# Patient Record
Sex: FEMALE | Race: WHITE | NOT HISPANIC OR LATINO | RURAL
[De-identification: names, ages, dates, MRNs, and addresses within clinical notes are randomized per-mention and may not be internally consistent; named-entity substitution may affect disease eponyms.]

---

## 2024-08-21 ENCOUNTER — OFFICE VISIT (OUTPATIENT)
Dept: FAMILY MEDICINE | Facility: CLINIC | Age: 59
End: 2024-08-21
Payer: COMMERCIAL

## 2024-08-21 VITALS
DIASTOLIC BLOOD PRESSURE: 70 MMHG | RESPIRATION RATE: 16 BRPM | TEMPERATURE: 98 F | SYSTOLIC BLOOD PRESSURE: 120 MMHG | HEART RATE: 78 BPM | HEIGHT: 64 IN | BODY MASS INDEX: 36.31 KG/M2 | WEIGHT: 212.69 LBS | OXYGEN SATURATION: 98 %

## 2024-08-21 DIAGNOSIS — R53.83 FATIGUE, UNSPECIFIED TYPE: ICD-10-CM

## 2024-08-21 DIAGNOSIS — R63.5 WEIGHT GAIN: Primary | ICD-10-CM

## 2024-08-21 PROCEDURE — 1160F RVW MEDS BY RX/DR IN RCRD: CPT | Mod: ,,, | Performed by: NURSE PRACTITIONER

## 2024-08-21 PROCEDURE — 1159F MED LIST DOCD IN RCRD: CPT | Mod: ,,, | Performed by: NURSE PRACTITIONER

## 2024-08-21 PROCEDURE — 99202 OFFICE O/P NEW SF 15 MIN: CPT | Mod: ,,, | Performed by: NURSE PRACTITIONER

## 2024-08-21 PROCEDURE — 3008F BODY MASS INDEX DOCD: CPT | Mod: ,,, | Performed by: NURSE PRACTITIONER

## 2024-08-21 PROCEDURE — 3078F DIAST BP <80 MM HG: CPT | Mod: ,,, | Performed by: NURSE PRACTITIONER

## 2024-08-21 PROCEDURE — 3074F SYST BP LT 130 MM HG: CPT | Mod: ,,, | Performed by: NURSE PRACTITIONER

## 2024-08-21 RX ORDER — FLUOXETINE HYDROCHLORIDE 20 MG/1
60 CAPSULE ORAL DAILY
COMMUNITY
Start: 2024-07-08

## 2024-08-21 NOTE — PROGRESS NOTES
Glo Garcia NP   [address] [place] [address]     PATIENT NAME: Amee Mckeon  : 1965  DATE: 24  MRN: [id number]      Billing Provider: Glo Garcia NP  Level of Service:   Patient PCP Information       Provider PCP Type    Primary Doctor No General            Reason for Visit / Chief Complaint: Establish Care (Establish care,discuss weight gain.)       Update PCP  Update Chief Complaint         History of Present Illness / Problem Focused Workflow     Amee Mckeon presents to the clinic with Establish Care (Establish care,discuss weight gain.)     HPI  Patient presents to clinic today wishing to establish care. She is a previous patient of SOHEILA Marie NP. She has a PMH of anxiety/depression. She is also a patient of Dr. Powers, cardiology, and is being worked up for some tachycardia and bradycardia episodes. She is scheduled for stress test and ultrasound tomorrow. Last lab work was approximately 1 week ago. She recently had a sleep study that was normal. Her main concern today is weight gain. She reports that she is the heaviest she has ever been. She has tried dieting and exercise without resolve. She has had a hysterectomy and is not currently on any hormone therapy. She reports having hormone labs drawn a couple years ago but was told they were normal. She does also work both night shift and day shift and reports that this is very taxing on her body.    Review of Systems     Review of Systems   Constitutional:  Positive for fatigue and unexpected weight change. Negative for activity change, appetite change, chills and fever.   HENT:  Negative for ear pain, hearing loss, trouble swallowing and voice change.    Eyes:  Negative for visual disturbance.   Respiratory:  Negative for apnea, cough, chest tightness and shortness of breath.    Cardiovascular:  Negative for chest pain, palpitations and leg swelling.   Gastrointestinal:  Negative for abdominal pain, blood in stool, change in bowel  "habit and reflux.   Genitourinary:  Negative for bladder incontinence, difficulty urinating and flank pain.   Musculoskeletal:  Negative for back pain, gait problem, joint swelling and myalgias.   Integumentary:  Negative for color change and pallor.   Neurological:  Negative for dizziness, weakness, numbness and headaches.   Psychiatric/Behavioral:  Negative for sleep disturbance. The patient is not nervous/anxious.         Medical / Social / Family History   History reviewed. No pertinent past medical history.    History reviewed. No pertinent surgical history.    Social History  Ms.  reports that she has never smoked. She has never used smokeless tobacco. She reports that she does not drink alcohol and does not use drugs.    Family History  Ms.'s family history is not on file.    Medications and Allergies     Medications  Outpatient Medications Marked as Taking for the 8/21/24 encounter (Office Visit) with Glo Garcia NP   Medication Sig Dispense Refill    FLUoxetine 20 MG capsule Take 60 mg by mouth once daily.         Allergies  Review of patient's allergies indicates:  No Known Allergies    Physical Examination   /70 (BP Location: Left arm, Patient Position: Sitting, BP Method: Large (Manual))   Pulse 78   Temp 97.8 °F (36.6 °C) (Oral)   Resp 16   Ht 5' 4" (1.626 m)   Wt 96.5 kg (212 lb 11.2 oz)   SpO2 98%   BMI 36.51 kg/m²    Physical Exam  Vitals and nursing note reviewed.   Constitutional:       Appearance: Normal appearance. She is obese.   HENT:      Head: Normocephalic.      Nose: Nose normal.      Mouth/Throat:      Mouth: Mucous membranes are moist.   Eyes:      Extraocular Movements: Extraocular movements intact.      Conjunctiva/sclera: Conjunctivae normal.      Pupils: Pupils are equal, round, and reactive to light.   Cardiovascular:      Rate and Rhythm: Normal rate and regular rhythm.      Pulses: Normal pulses.      Heart sounds: Normal heart sounds.   Pulmonary:      Effort: " Pulmonary effort is normal.      Breath sounds: Normal breath sounds.   Abdominal:      General: Abdomen is flat. Bowel sounds are normal.      Palpations: Abdomen is soft.   Musculoskeletal:         General: Normal range of motion.      Cervical back: Normal range of motion and neck supple.   Skin:     General: Skin is warm and dry.      Capillary Refill: Capillary refill takes less than 2 seconds.   Neurological:      General: No focal deficit present.      Mental Status: She is alert and oriented to person, place, and time.   Psychiatric:         Behavior: Behavior normal.         Thought Content: Thought content normal.          Assessment and Plan (including Health Maintenance)      Problem List  Smart Sets  Document Outside HM   :    Plan:   Recommend repeat hormone lab work. Let me know if need referral.  Will obtain recent labs from Dr. Powers for review.  Continue diet and exercise.  RTC as needed.      Health Maintenance Due   Topic Date Due    Hepatitis C Screening  Never done    Cervical Cancer Screening  Never done    Lipid Panel  Never done    HIV Screening  Never done    TETANUS VACCINE  Never done    Mammogram  Never done    Hemoglobin A1c (Diabetic Prevention Screening)  Never done    Colorectal Cancer Screening  Never done    Shingles Vaccine (1 of 2) Never done    COVID-19 Vaccine (1 - 2023-24 season) Never done       Problem List Items Addressed This Visit    None      Health Maintenance Topics with due status: Not Due       Topic Last Completion Date    Influenza Vaccine Not Due       No future appointments.         Signature:  Glo Garcia NP      [address]   [place] [address]    Date of encounter: 8/21/24

## 2025-01-16 ENCOUNTER — OFFICE VISIT (OUTPATIENT)
Dept: FAMILY MEDICINE | Facility: CLINIC | Age: 60
End: 2025-01-16
Payer: COMMERCIAL

## 2025-01-16 VITALS
BODY MASS INDEX: 37.05 KG/M2 | RESPIRATION RATE: 20 BRPM | HEART RATE: 70 BPM | WEIGHT: 217 LBS | HEIGHT: 64 IN | OXYGEN SATURATION: 99 % | TEMPERATURE: 99 F | DIASTOLIC BLOOD PRESSURE: 67 MMHG | SYSTOLIC BLOOD PRESSURE: 102 MMHG

## 2025-01-16 DIAGNOSIS — J10.1 INFLUENZA A: Primary | ICD-10-CM

## 2025-01-16 RX ORDER — PROMETHAZINE HYDROCHLORIDE AND DEXTROMETHORPHAN HYDROBROMIDE 6.25; 15 MG/5ML; MG/5ML
5 SYRUP ORAL NIGHTLY PRN
Qty: 120 ML | Refills: 0 | Status: SHIPPED | OUTPATIENT
Start: 2025-01-16

## 2025-01-16 RX ORDER — OSELTAMIVIR PHOSPHATE 75 MG/1
75 CAPSULE ORAL 2 TIMES DAILY
Qty: 10 CAPSULE | Refills: 0 | Status: SHIPPED | OUTPATIENT
Start: 2025-01-16 | End: 2025-01-21

## 2025-01-16 NOTE — LETTER
January 16, 2025    Amee Mckeon  [address] [place] [address] [place] [address]             [facility]  Family Medicine  [address]  [place] [address]  Phone: [phone]   January 16, 2025     Patient: Amee Mckeon   YOB: 1965   Date of Visit: 1/16/2025       To Whom it May Concern:    Amee Mckeon was seen in my clinic on 1/16/2025. She may return to work on 01/20/2025 .    Please excuse her from any classes or work missed.    If you have any questions or concerns, please don't hesitate to call.    Sincerely,         Glo Garcia, NP

## 2025-01-16 NOTE — LETTER
January 21, 2025      [facility]  [address]  [place] [address]  Phone: [phone]       Patient: Amee Mckeon   YOB: 1965  Date of Visit: 01/16/2025    To Whom It May Concern:    EDWIGE Mckeon  was at [facility] on 01/16/2025. The patient may return to work/school on 01/22/2025 with no restrictions. If you have any questions or concerns, or if I can be of further assistance, please do not hesitate to contact me.    Sincerely,    Ericka Esteban LPN

## 2025-02-16 NOTE — PROGRESS NOTES
Glo Garcia NP   [address] [place] [address]     PATIENT NAME: Amee cMkeon  : 1965  DATE: 25  MRN: [id number]      Billing Provider: Glo Garcia NP  Level of Service: [place] OFFICE/OUTPT VISIT, EST, LEVL III, 20-29 MIN  Patient PCP Information       Provider PCP Type    Primary Doctor No General            Reason for Visit / Chief Complaint: Cough, Nasal Congestion, Generalized Body Aches, and Fever (Symptoms x1 day)       Update PCP  Update Chief Complaint         History of Present Illness / Problem Focused Workflow     Amee Mckeon presents to the clinic with Cough, Nasal Congestion, Generalized Body Aches, and Fever (Symptoms x1 day)     History of Present Illness    HPI:  Patient's symptoms began suddenly the previous day while at work, continuing until 2:30 PM when they rapidly worsened. Symptoms include coughing, congestion, body aches, fever, sore throat, and ear pain. Patient rarely has fevers and finds the cough particularly troublesome. She spent most of yesterday bedridden, only getting up a few times. Her urine has a strong ammonia odor, possibly due to dehydration. She has been drinking water but may not have consumed enough fluids yesterday. Patient hopes to return to work in a few days on Monday night.    MEDICAL HISTORY:  Patient has a history of flu, with a recent positive test.    TEST RESULTS:  Patient had a flu test in the past, which came back positive. She also underwent a COVID test previously, which was negative.    SOCIAL HISTORY:  Patient works until 2:30.      ROS:  General: +fever, -chills, -fatigue, -weight gain, -weight loss  Eyes: -vision changes, -redness, -discharge  ENT: +ear pain, +nasal congestion, +sore throat  Cardiovascular: +chest pain, -palpitations, -lower extremity edema  Respiratory: +cough, -shortness of breath  Gastrointestinal: -abdominal pain, -nausea, -vomiting, -diarrhea, -constipation, -blood in stool  Genitourinary: -dysuria,  "-hematuria, -frequency  Musculoskeletal: -joint pain, +muscle pain  Skin: -rash, -lesion  Neurological: -headache, -dizziness, -numbness, -tingling  Psychiatric: -anxiety, -depression, -sleep difficulty                Medical / Social / Family History   History reviewed. No pertinent past medical history.    History reviewed. No pertinent surgical history.    Social History  Ms.  reports that she has never smoked. She has never used smokeless tobacco. She reports that she does not drink alcohol and does not use drugs.    Family History  MsTyler's family history is not on file.    Medications and Allergies     Medications  Outpatient Medications Marked as Taking for the 1/16/25 encounter (Office Visit) with Glo Garcia NP   Medication Sig Dispense Refill    FLUoxetine 20 MG capsule Take 60 mg by mouth once daily.         Allergies  Review of patient's allergies indicates:  No Known Allergies    Physical Examination   /67 (BP Location: Left arm, Patient Position: Sitting)   Pulse 70   Temp 99 °F (37.2 °C) (Oral)   Resp 20   Ht 5' 4" (1.626 m)   Wt 98.4 kg (217 lb)   SpO2 99%   BMI 37.25 kg/m²    Physical Exam    General: No acute distress. Well-developed. Well-nourished.  Eyes: EOMI. Sclerae anicteric.  HENT: Normocephalic. Atraumatic. Nares patent. Moist oral mucosa. Nasal congestion present. Posterior pharynx inj. With pnd. Bilateral TM dull.  Cardiovascular: Regular rate. Regular rhythm. No murmurs. No rubs. No gallops. Normal S1, S2.  Respiratory: Normal respiratory effort. Clear to auscultation bilaterally. No rales. No rhonchi. No wheezing.  Musculoskeletal: No  obvious deformity.  Extremities: No lower extremity edema.  Neurological: Alert & oriented x3. No slurred speech. Normal gait.  Psychiatric: Normal mood. Normal affect. Good insight. Good judgment.  Skin: Warm. Dry. No rash.           Assessment and Plan (including Health Maintenance)      Problem List  Smart Sets  Document Outside HM "   :    Assessment & Plan    IMPRESSION:  - Positive flu test, with symptoms starting yesterday  - Considering possibility of dehydration due to patient's report of strong urine odor  - Assessing for potential chest tightness or bronchitis development  - Initiated antiviral treatment with Tamiflu to prevent progression to bronchitis    INFLUENZA:  - Confirmed positive flu test.  - Emphasized the importance of starting Tamiflu early for better efficacy.  - Prescribed Tamiflu to be started immediately.  - Instructed the patient to monitor for worsening symptoms, particularly related to chest tightness or dyspnea.  - Noted patient's report of myalgia as part of flu symptoms.  - Advised that overall flu treatment may help alleviate myalgia symptoms.  - Patient to rest and stay in bed as needed.  - Recommend the patient to remain off work until 24 hours fever-free.    RESPIRATORY SYMPTOMS:  - Educated the patient about recognizing symptoms of bronchitis and when to seek further care.  - Recommend Mucinex for daytime use and cough suppressant for nocturnal use.  - Instructed the patient to follow up if symptoms worsen or develop signs of bronchitis.  - Prescribed Flonase nasal spray for congestion.    ALLERGIES:  - Recommend an antihistamine, either Zyrtec or Claritin, to be taken in the morning.    HYDRATION:  - Advised increased fluid intake.  - Explained importance of staying hydrated, especially when taking Mucinex.  - Patient to increase fluid intake, especially water.    FOLLOW UP:  - Advised the patient to contact the office if needed tomorrow.              Health Maintenance Due   Topic Date Due    Hepatitis C Screening  Never done    Cervical Cancer Screening  Never done    HIV Screening  Never done    TETANUS VACCINE  Never done    Mammogram  Never done    Hemoglobin A1c (Diabetic Prevention Screening)  Never done    Colorectal Cancer Screening  Never done    Shingles Vaccine (1 of 2) Never done    Pneumococcal  Vaccines (Age 50+) (1 of 1 - PCV) Never done    Influenza Vaccine (1) Never done    COVID-19 Vaccine (1 - 2024-25 season) Never done       Problem List Items Addressed This Visit    None  Visit Diagnoses         Influenza A    -  Primary    Relevant Medications    promethazine-dextromethorphan (PROMETHAZINE-DM) 6.25-15 mg/5 mL Syrp            Health Maintenance Topics with due status: Not Due       Topic Last Completion Date    Lipid Panel 11/05/2024    RSV Vaccine (Age 60+ and Pregnant patients) Not Due       Future Appointments   Date Time Provider Department Center   7/17/2025  1:30 PM Glo Garcia NP [facility]            Signature:  Glo Garcia NP      [address]   [place] [address]    Date of encounter: 1/16/25

## 2025-07-18 ENCOUNTER — HOSPITAL ENCOUNTER (EMERGENCY)
Facility: HOSPITAL | Age: 60
Discharge: HOME | End: 2025-07-18
Payer: COMMERCIAL

## 2025-07-18 VITALS
BODY MASS INDEX: 35.44 KG/M2 | HEIGHT: 63 IN | TEMPERATURE: 98 F | DIASTOLIC BLOOD PRESSURE: 80 MMHG | HEART RATE: 62 BPM | OXYGEN SATURATION: 100 % | RESPIRATION RATE: 16 BRPM | WEIGHT: 200 LBS | SYSTOLIC BLOOD PRESSURE: 149 MMHG

## 2025-07-18 DIAGNOSIS — L08.9 CAT BITE OF RIGHT HAND WITH INFECTION, INITIAL ENCOUNTER: Primary | ICD-10-CM
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
## 2025-07-18 DIAGNOSIS — S61.451A CAT BITE OF RIGHT HAND WITH INFECTION, INITIAL ENCOUNTER: Primary | ICD-10-CM

## 2025-07-18 LAB
ANION GAP SERPL CALCULATED.3IONS-SCNC: 14 MMOL/L (ref 7–16)
BASOPHILS # BLD AUTO: 0.05 K/UL (ref 0–0.2)
BASOPHILS NFR BLD AUTO: 0.3 % (ref 0–1)
BUN SERPL-MCNC: 17 MG/DL (ref 10–20)
BUN/CREAT SERPL: 20 (ref 6–20)
CALCIUM SERPL-MCNC: 9.8 MG/DL (ref 8.4–10.2)
CHLORIDE SERPL-SCNC: 106 MMOL/L (ref 98–107)
CO2 SERPL-SCNC: 22 MMOL/L (ref 22–29)
CREAT SERPL-MCNC: 0.87 MG/DL (ref 0.55–1.02)
DIFFERENTIAL METHOD BLD: ABNORMAL
EGFR (NO RACE VARIABLE) (RUSH/TITUS): 77 ML/MIN/1.73M2
EOSINOPHIL # BLD AUTO: 0.31 K/UL (ref 0–0.5)
EOSINOPHIL NFR BLD AUTO: 2.1 % (ref 1–4)
ERYTHROCYTE [DISTWIDTH] IN BLOOD BY AUTOMATED COUNT: 12.4 % (ref 11.5–14.5)
GLUCOSE SERPL-MCNC: 97 MG/DL (ref 74–100)
HCT VFR BLD AUTO: 37.9 % (ref 38–47)
HGB BLD-MCNC: 13.4 G/DL (ref 12–16)
IMM GRANULOCYTES # BLD AUTO: 0.06 K/UL (ref 0–0.04)
IMM GRANULOCYTES NFR BLD: 0.4 % (ref 0–0.4)
LYMPHOCYTES # BLD AUTO: 3.31 K/UL (ref 1–4.8)
LYMPHOCYTES NFR BLD AUTO: 22.8 % (ref 27–41)
MCH RBC QN AUTO: 32.2 PG (ref 27–31)
MCHC RBC AUTO-ENTMCNC: 35.4 G/DL (ref 32–36)
MCV RBC AUTO: 91.1 FL (ref 80–96)
MONOCYTES # BLD AUTO: 0.99 K/UL (ref 0–0.8)
MONOCYTES NFR BLD AUTO: 6.8 % (ref 2–6)
MPC BLD CALC-MCNC: 9.7 FL (ref 9.4–12.4)
NEUTROPHILS # BLD AUTO: 9.77 K/UL (ref 1.8–7.7)
NEUTROPHILS NFR BLD AUTO: 67.6 % (ref 53–65)
NRBC # BLD AUTO: 0 X10E3/UL
NRBC, AUTO (.00): 0 %
PLATELET # BLD AUTO: 210 K/UL (ref 150–400)
POTASSIUM SERPL-SCNC: 3.5 MMOL/L (ref 3.5–5.1)
RBC # BLD AUTO: 4.16 M/UL (ref 4.2–5.4)
SODIUM SERPL-SCNC: 138 MMOL/L (ref 136–145)
WBC # BLD AUTO: 14.49 K/UL (ref 4.5–11)

## 2025-07-18 PROCEDURE — 99284 EMERGENCY DEPT VISIT MOD MDM: CPT | Mod: 25

## 2025-07-18 PROCEDURE — 99284 EMERGENCY DEPT VISIT MOD MDM: CPT | Mod: ,,, | Performed by: NURSE PRACTITIONER

## 2025-07-18 PROCEDURE — 63600175 PHARM REV CODE 636 W HCPCS: Performed by: NURSE PRACTITIONER

## 2025-07-18 PROCEDURE — 80048 BASIC METABOLIC PNL TOTAL CA: CPT | Performed by: NURSE PRACTITIONER

## 2025-07-18 PROCEDURE — 90471 IMMUNIZATION ADMIN: CPT | Performed by: NURSE PRACTITIONER

## 2025-07-18 PROCEDURE — 36415 COLL VENOUS BLD VENIPUNCTURE: CPT | Performed by: NURSE PRACTITIONER

## 2025-07-18 PROCEDURE — 85025 COMPLETE CBC W/AUTO DIFF WBC: CPT | Performed by: NURSE PRACTITIONER

## 2025-07-18 PROCEDURE — 96372 THER/PROPH/DIAG INJ SC/IM: CPT | Performed by: NURSE PRACTITIONER

## 2025-07-18 PROCEDURE — 90715 TDAP VACCINE 7 YRS/> IM: CPT | Performed by: NURSE PRACTITIONER

## 2025-07-18 RX ORDER — VIT C/E/ZN/COPPR/LUTEIN/ZEAXAN 250MG-90MG
500 CAPSULE ORAL
COMMUNITY

## 2025-07-18 RX ORDER — PRAVASTATIN SODIUM 10 MG/1
10 TABLET ORAL
COMMUNITY
Start: 2025-06-25

## 2025-07-18 RX ORDER — AMOXICILLIN AND CLAVULANATE POTASSIUM 875; 125 MG/1; MG/1
1 TABLET, FILM COATED ORAL 2 TIMES DAILY
Qty: 20 TABLET | Refills: 0 | Status: SHIPPED | OUTPATIENT
Start: 2025-07-18 | End: 2025-07-28

## 2025-07-18 RX ORDER — LIDOCAINE HYDROCHLORIDE 10 MG/ML
2.1 INJECTION, SOLUTION INFILTRATION; PERINEURAL
Status: COMPLETED | OUTPATIENT
Start: 2025-07-18 | End: 2025-07-18

## 2025-07-18 RX ORDER — CALCIUM CARB/VITAMIN D3/VIT K1 500-500-40
1 TABLET,CHEWABLE ORAL DAILY
COMMUNITY

## 2025-07-18 RX ORDER — CEFTRIAXONE 1 G/1
1 INJECTION, POWDER, FOR SOLUTION INTRAMUSCULAR; INTRAVENOUS ONCE
Status: COMPLETED | OUTPATIENT
Start: 2025-07-18 | End: 2025-07-18

## 2025-07-18 RX ADMIN — CLOSTRIDIUM TETANI TOXOID ANTIGEN (FORMALDEHYDE INACTIVATED), CORYNEBACTERIUM DIPHTHERIAE TOXOID ANTIGEN (FORMALDEHYDE INACTIVATED), BORDETELLA PERTUSSIS TOXOID ANTIGEN (GLUTARALDEHYDE INACTIVATED), BORDETELLA PERTUSSIS FILAMENTOUS HEMAGGLUTININ ANTIGEN (FORMALDEHYDE INACTIVATED), BORDETELLA PERTUSSIS PERTACTIN ANTIGEN, AND BORDETELLA PERTUSSIS FIMBRIAE 2/3 ANTIGEN 0.5 ML: 5; 2; 2.5; 5; 3; 5 INJECTION, SUSPENSION INTRAMUSCULAR at 06:07

## 2025-07-18 RX ADMIN — CEFTRIAXONE 1 G: 1 INJECTION, POWDER, FOR SOLUTION INTRAMUSCULAR; INTRAVENOUS at 06:07

## 2025-07-18 RX ADMIN — LIDOCAINE HYDROCHLORIDE 2.1 ML: 10 INJECTION, SOLUTION INFILTRATION; PERINEURAL at 06:07

## 2025-07-18 NOTE — ED PROVIDER NOTES
Encounter Date: 7/18/2025       History     Chief Complaint   Patient presents with    Animal Bite     Presented with c/o cat bite to right hand that occurred yesterday that is now red and swollen today. Denies fever or n/v. Reports that cat belongs to her son and is familiar with her. Notes it is inside and outside cat. She reports that she went to pick him up and he bit her. Unsure of rabies immunization status for cat. Not UTD with her tetanus.      Review of patient's allergies indicates:  No Known Allergies  Past Medical History:   Diagnosis Date    Depression      History reviewed. No pertinent surgical history.  No family history on file.  Social History[1]  Review of Systems   Constitutional:  Negative for activity change, appetite change and fever.   HENT: Negative.     Respiratory:  Negative for cough and shortness of breath.    Cardiovascular:  Negative for chest pain.   Gastrointestinal:  Negative for abdominal pain, nausea and vomiting.   Genitourinary: Negative.    Musculoskeletal:  Positive for myalgias.   Skin:  Positive for wound.   Psychiatric/Behavioral: Negative.         Physical Exam     Initial Vitals [07/18/25 1822]   BP Pulse Resp Temp SpO2   (!) 149/80 63 16 97.8 °F (36.6 °C) 100 %      MAP       --         Physical Exam    Nursing note and vitals reviewed.  Constitutional: She appears well-developed and well-nourished. No distress.   HENT:   Head: Normocephalic.   Eyes: Conjunctivae and EOM are normal.   Neck: Neck supple.   Normal range of motion.  Cardiovascular:  Normal rate, regular rhythm, normal heart sounds and intact distal pulses.           No murmur heard.  Pulmonary/Chest: Breath sounds normal. She has no wheezes. She has no rhonchi. She has no rales.   Abdominal: Abdomen is soft. Bowel sounds are normal. There is no abdominal tenderness.   Musculoskeletal:         General: Edema (right hand) present. Normal range of motion.        Hands:       Cervical back: Normal range of  motion and neck supple.     Skin: Skin is warm and dry. There is erythema (right hand).   Psychiatric: She has a normal mood and affect.               Medical Screening Exam   See Full Note    ED Course   Procedures  Labs Reviewed   CBC WITH DIFFERENTIAL - Abnormal       Result Value    WBC 14.49 (*)     RBC 4.16 (*)     Hemoglobin 13.4      Hematocrit 37.9 (*)     MCV 91.1      MCH 32.2 (*)     MCHC 35.4      RDW 12.4      Platelet Count 210      MPV 9.7      Neutrophils % 67.6 (*)     Lymphocytes % 22.8 (*)     Monocytes % 6.8 (*)     Eosinophils % 2.1      Basophils % 0.3      Immature Granulocytes % 0.4      nRBC, Auto 0.0      Neutrophils, Abs 9.77 (*)     Lymphocytes, Absolute 3.31      Monocytes, Absolute 0.99 (*)     Eosinophils, Absolute 0.31      Basophils, Absolute 0.05      Immature Granulocytes, Absolute 0.06 (*)     nRBC, Absolute 0.00      Diff Type Auto     BASIC METABOLIC PANEL - Normal    Sodium 138      Potassium 3.5      Chloride 106      CO2 22      Anion Gap 14      Glucose 97      BUN 17      Creatinine 0.87      BUN/Creatinine Ratio 20      Calcium 9.8      eGFR 77     CBC W/ AUTO DIFFERENTIAL    Narrative:     The following orders were created for panel order CBC Auto Differential.  Procedure                               Abnormality         Status                     ---------                               -----------         ------                     CBC with Differential[[id number]]       Abnormal            Final result                 Please view results for these tests on the individual orders.          Imaging Results    None          Medications   cefTRIAXone injection 1 g (1 g Intramuscular Given 7/18/25 1844)   LIDOcaine HCL 10 mg/ml (1%) injection 2.1 mL (2.1 mLs Intramuscular Given 7/18/25 1844)   Tdap vaccine injection 0.5 mL (0.5 mLs Intramuscular Given 7/18/25 1844)     Medical Decision Making  Presented with c/o cat bite to right hand that occurred yesterday that is now red  and swollen today. Denies fever or n/v. Reports that cat belongs to her son and is familiar with her. Notes it is inside and outside cat. She reports that she went to pick him up and he bit her. Unsure of rabies immunization status for cat. Not UTD with her tetanus.    Amount and/or Complexity of Data Reviewed  Labs: ordered. Decision-making details documented in ED Course.     Details: WBC 88581 with H&H 13.4 and 37.9, plt 009645, Na 138, K+ 3.5, Bun 17, creatinine 0.87.    Risk  Prescription drug management.  Risk Details: Rocephin 1 Gm IM, Tdap 0.5ml IM given. Discussed assessment and diagnostic findings with pt. Rx for Augmentin.  Instructed on home care, med use, follow-up and return precautions. Discharged home with detailed written instructions provided.                 ED Course as of 07/18/25 1911 Fri Jul 18, 2025 1854 WBC(!): 14.49 [DS]   1854 Hemoglobin: 13.4 [DS]   1855 Hematocrit(!): 37.9 [DS]   1855 Platelet Count: 210 [DS]   1858 Sodium: 138 [DS]   1858 Potassium: 3.5 [DS]   1858 BUN: 17 [DS]   1858 Creatinine: 0.87 [DS]      ED Course User Index  [DS] Dyan Chapin NP                           Clinical Impression:   Final diagnoses:  [S61.451A, L08.9, W55.01XA] Cat bite of right hand with infection, initial encounter (Primary)        ED Disposition Condition    Discharge Stable          ED Prescriptions       Medication Sig Dispense Start Date End Date Auth. Provider    amoxicillin-clavulanate 875-125mg (AUGMENTIN) 875-125 mg per tablet Take 1 tablet by mouth 2 (two) times daily. for 10 days 20 tablet 7/18/2025 7/28/2025 Dyan Chapin NP          Follow-up Information       Follow up With Specialties Details Why Contact Info    [facility] Emergency Medicine  If symptoms worsen [address] [place] [address] [phone]               [1]   Social History  Tobacco Use    Smoking status: Never    Smokeless tobacco: Never   Vaping  Use    Vaping status: Never Used   Substance Use Topics    Alcohol use: Never    Drug use: Never        Dyan Chapin NP  07/18/25 [id number]

## 2025-07-18 NOTE — DISCHARGE INSTRUCTIONS
Take Augmentin as prescribed for all 10 days. Elevate right hand when lying or sitting. Do not hang at side when standing. Warm water or compresses for 15-20 minutes 4-6 times per day. Follow-up with your PCP on Monday for recheck. Return to the ED for new or worsening symptoms.

## 2025-07-20 ENCOUNTER — HOSPITAL ENCOUNTER (EMERGENCY)
Facility: HOSPITAL | Age: 60
Discharge: HOME | End: 2025-07-20
Payer: COMMERCIAL

## 2025-07-20 VITALS
HEIGHT: 63 IN | WEIGHT: 200 LBS | HEART RATE: 58 BPM | SYSTOLIC BLOOD PRESSURE: 106 MMHG | DIASTOLIC BLOOD PRESSURE: 74 MMHG | BODY MASS INDEX: 35.44 KG/M2 | TEMPERATURE: 98 F | OXYGEN SATURATION: 99 % | RESPIRATION RATE: 14 BRPM

## 2025-07-20 DIAGNOSIS — S61.451D CAT BITE OF RIGHT HAND, SUBSEQUENT ENCOUNTER: Primary | ICD-10-CM
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
## 2025-07-20 DIAGNOSIS — L03.113 CELLULITIS OF RIGHT UPPER EXTREMITY: ICD-10-CM

## 2025-07-20 LAB
ALBUMIN SERPL BCP-MCNC: 3.7 G/DL (ref 3.5–5)
ALBUMIN/GLOB SERPL: 1.2 {RATIO}
ALP SERPL-CCNC: 121 U/L (ref 40–150)
ALT SERPL W P-5'-P-CCNC: 43 U/L
ANION GAP SERPL CALCULATED.3IONS-SCNC: 11 MMOL/L (ref 7–16)
AST SERPL W P-5'-P-CCNC: 45 U/L (ref 11–45)
BACTERIA #/AREA URNS HPF: ABNORMAL /HPF
BASOPHILS # BLD AUTO: 0.04 K/UL (ref 0–0.2)
BASOPHILS NFR BLD AUTO: 0.5 % (ref 0–1)
BILIRUB SERPL-MCNC: 0.5 MG/DL
BILIRUB UR QL STRIP: NEGATIVE
BUN SERPL-MCNC: 8 MG/DL (ref 10–20)
BUN/CREAT SERPL: 11 (ref 6–20)
CALCIUM SERPL-MCNC: 9.3 MG/DL (ref 8.4–10.2)
CHLORIDE SERPL-SCNC: 107 MMOL/L (ref 98–107)
CLARITY UR: CLEAR
CO2 SERPL-SCNC: 24 MMOL/L (ref 22–29)
COLOR UR: ABNORMAL
CREAT SERPL-MCNC: 0.75 MG/DL (ref 0.55–1.02)
DIFFERENTIAL METHOD BLD: ABNORMAL
EGFR (NO RACE VARIABLE) (RUSH/TITUS): 92 ML/MIN/1.73M2
EOSINOPHIL # BLD AUTO: 0.28 K/UL (ref 0–0.5)
EOSINOPHIL NFR BLD AUTO: 3.3 % (ref 1–4)
ERYTHROCYTE [DISTWIDTH] IN BLOOD BY AUTOMATED COUNT: 12.7 % (ref 11.5–14.5)
GLOBULIN SER-MCNC: 3.1 G/DL (ref 2–4)
GLUCOSE SERPL-MCNC: 115 MG/DL (ref 74–100)
GLUCOSE UR STRIP-MCNC: NEGATIVE MG/DL
HCT VFR BLD AUTO: 37.9 % (ref 38–47)
HGB BLD-MCNC: 13.5 G/DL (ref 12–16)
IMM GRANULOCYTES # BLD AUTO: 0.04 K/UL (ref 0–0.04)
IMM GRANULOCYTES NFR BLD: 0.5 % (ref 0–0.4)
KETONES UR STRIP-SCNC: NEGATIVE MG/DL
LEUKOCYTE ESTERASE UR QL STRIP: ABNORMAL
LYMPHOCYTES # BLD AUTO: 1.82 K/UL (ref 1–4.8)
LYMPHOCYTES NFR BLD AUTO: 21.3 % (ref 27–41)
MCH RBC QN AUTO: 32.7 PG (ref 27–31)
MCHC RBC AUTO-ENTMCNC: 35.6 G/DL (ref 32–36)
MCV RBC AUTO: 91.8 FL (ref 80–96)
MONOCYTES # BLD AUTO: 0.65 K/UL (ref 0–0.8)
MONOCYTES NFR BLD AUTO: 7.6 % (ref 2–6)
MPC BLD CALC-MCNC: 9.9 FL (ref 9.4–12.4)
MUCOUS THREADS #/AREA URNS HPF: ABNORMAL /HPF
NEUTROPHILS # BLD AUTO: 5.7 K/UL (ref 1.8–7.7)
NEUTROPHILS NFR BLD AUTO: 66.8 % (ref 53–65)
NITRITE UR QL STRIP: NEGATIVE
NRBC # BLD AUTO: 0 X10E3/UL
NRBC, AUTO (.00): 0 %
PH UR STRIP: 5.5 PH UNITS
PLATELET # BLD AUTO: 236 K/UL (ref 150–400)
POTASSIUM SERPL-SCNC: 4.1 MMOL/L (ref 3.5–5.1)
PROT SERPL-MCNC: 6.8 G/DL (ref 6.4–8.3)
PROT UR QL STRIP: NEGATIVE
RBC # BLD AUTO: 4.13 M/UL (ref 4.2–5.4)
RBC # UR STRIP: ABNORMAL /UL
RBC #/AREA URNS HPF: ABNORMAL /HPF
SODIUM SERPL-SCNC: 138 MMOL/L (ref 136–145)
SP GR UR STRIP: <=1.005
SQUAMOUS #/AREA URNS LPF: ABNORMAL /LPF
TRANS CELLS #/AREA URNS LPF: ABNORMAL /LPF
UROBILINOGEN UR STRIP-ACNC: 0.2 MG/DL
WBC # BLD AUTO: 8.53 K/UL (ref 4.5–11)
WBC #/AREA URNS HPF: ABNORMAL /HPF

## 2025-07-20 PROCEDURE — 99284 EMERGENCY DEPT VISIT MOD MDM: CPT | Mod: 25

## 2025-07-20 PROCEDURE — 81003 URINALYSIS AUTO W/O SCOPE: CPT | Performed by: NURSE PRACTITIONER

## 2025-07-20 PROCEDURE — 96374 THER/PROPH/DIAG INJ IV PUSH: CPT

## 2025-07-20 PROCEDURE — 99284 EMERGENCY DEPT VISIT MOD MDM: CPT | Mod: ,,, | Performed by: NURSE PRACTITIONER

## 2025-07-20 PROCEDURE — 63600175 PHARM REV CODE 636 W HCPCS: Performed by: NURSE PRACTITIONER

## 2025-07-20 PROCEDURE — 80053 COMPREHEN METABOLIC PANEL: CPT | Performed by: NURSE PRACTITIONER

## 2025-07-20 PROCEDURE — 87086 URINE CULTURE/COLONY COUNT: CPT | Performed by: NURSE PRACTITIONER

## 2025-07-20 PROCEDURE — 85025 COMPLETE CBC W/AUTO DIFF WBC: CPT | Performed by: NURSE PRACTITIONER

## 2025-07-20 RX ORDER — FLUCONAZOLE 200 MG/1
200 TABLET ORAL DAILY
Qty: 2 TABLET | Refills: 0 | Status: SHIPPED | OUTPATIENT
Start: 2025-07-20 | End: 2025-07-22

## 2025-07-20 RX ORDER — CEFTRIAXONE 1 G/1
1 INJECTION, POWDER, FOR SOLUTION INTRAMUSCULAR; INTRAVENOUS
Status: COMPLETED | OUTPATIENT
Start: 2025-07-20 | End: 2025-07-20

## 2025-07-20 RX ADMIN — CEFTRIAXONE SODIUM 1 G: 1 INJECTION, POWDER, FOR SOLUTION INTRAMUSCULAR; INTRAVENOUS at 01:07

## 2025-07-20 NOTE — DISCHARGE INSTRUCTIONS
Continue Augmentin as prescribed for infection until complete. Take Diflucan 1 tablet at onset of symptoms of yeast infection and 2nd dose once antibiotics are complete. Continue to elevate right hand when lying or sitting. Do not hang at side when standing. Apply warm compresses to right hand for 15-20 minutes 4-6 times per day. Take Tylenol or ibuprofen as needed for pain. Watch for fever, pain, increase in swelling or redness. Return to the ED for any signs of worsening.

## 2025-07-20 NOTE — Clinical Note
"Amee"JASPER Mckeon was seen and treated in our emergency department on 7/20/2025.  She may return to work on 07/24/2025.       If you have any questions or concerns, please don't hesitate to call.      Dyan Chapin, ASHLYN"

## 2025-07-20 NOTE — ED PROVIDER NOTES
Encounter Date: 7/20/2025       History     Chief Complaint   Patient presents with    Wound Check     Pt presents with wound check to right hand where she had cat bite 4 days ago and was seen in this ED 3 days ago. Noted for continued hand edema, redness and increased tenderness.      Presented with c/o concern for worsening of redness to right hand at site of cat bite. Was evaluated in the ED on 7/18 1 day following a cat bite. Received Rocephin 1 Gm IVP in ED. Was started on Augmentin. Pt reports compliance with med. Denies any drng from wound. Denies fever or chills, or n/v      Review of patient's allergies indicates:  No Known Allergies  Past Medical History:   Diagnosis Date    Depression      History reviewed. No pertinent surgical history.  No family history on file.  Social History[1]  Review of Systems   Constitutional:  Positive for activity change. Negative for appetite change, chills and fever.   HENT: Negative.     Respiratory: Negative.  Negative for cough, shortness of breath and wheezing.    Cardiovascular:  Negative for chest pain.   Gastrointestinal:  Negative for abdominal pain, nausea and vomiting.   Genitourinary: Negative.    Musculoskeletal:  Positive for myalgias.   Skin:  Positive for wound (right hand).   Neurological: Negative.  Negative for dizziness.   Psychiatric/Behavioral: Negative.         Physical Exam     Initial Vitals [07/20/25 1225]   BP Pulse Resp Temp SpO2   136/78 71 14 98 °F (36.7 °C) 99 %      MAP       --         Physical Exam    Nursing note and vitals reviewed.  Constitutional: She appears well-developed and well-nourished. No distress.   HENT:   Head: Normocephalic.   Eyes: EOM are normal.   Neck: Neck supple.   No axillary or cervical lymphadenopathy.   Normal range of motion.  Cardiovascular:  Normal rate, regular rhythm, normal heart sounds and intact distal pulses.           No murmur heard.  Pulmonary/Chest: Breath sounds normal. She has no wheezes. She has no  rhonchi. She has no rales.   Abdominal: Abdomen is soft. Bowel sounds are normal. There is no abdominal tenderness.   Musculoskeletal:         General: Normal range of motion.      Cervical back: Normal range of motion and neck supple.     Lymphadenopathy:     She has no cervical adenopathy.   Neurological: She is alert and oriented to person, place, and time. She has normal strength.   Skin: Skin is warm and dry. Capillary refill takes less than 2 seconds. There is erythema (right hand).   Localized swelling and erythema to right hand that extends to wrist area. No open wounds or drng noted. No reduction in ROM or movement of fingers. Radial pulse 3+ and equal bilat.    Psychiatric: She has a normal mood and affect.         Medical Screening Exam   See Full Note    ED Course   Procedures  Labs Reviewed   COMPREHENSIVE METABOLIC PANEL - Abnormal       Result Value    Sodium 138      Potassium 4.1      Chloride 107      CO2 24      Anion Gap 11      Glucose 115 (*)     BUN 8 (*)     Creatinine 0.75      BUN/Creatinine Ratio 11      Calcium 9.3      Total Protein 6.8      Albumin 3.7      Globulin 3.1      A/G Ratio 1.2      Bilirubin, Total 0.5      Alk Phos 121      ALT 43      AST 45      eGFR 92     URINALYSIS, REFLEX TO URINE CULTURE - Abnormal    Color, UA Light Yellow      Clarity, UA Clear      pH, UA 5.5      Leukocytes, UA Small (*)     Nitrites, UA Negative      Protein, UA Negative      Glucose, UA Negative      Ketones, UA Negative      Urobilinogen, UA 0.2      Bilirubin, UA Negative      Blood, UA Trace-Lysed (*)     Specific Gravity, UA <=1.005     CBC WITH DIFFERENTIAL - Abnormal    WBC 8.53      RBC 4.13 (*)     Hemoglobin 13.5      Hematocrit 37.9 (*)     MCV 91.8      MCH 32.7 (*)     MCHC 35.6      RDW 12.7      Platelet Count 236      MPV 9.9      Neutrophils % 66.8 (*)     Lymphocytes % 21.3 (*)     Monocytes % 7.6 (*)     Eosinophils % 3.3      Basophils % 0.5      Immature Granulocytes % 0.5  (*)     nRBC, Auto 0.0      Neutrophils, Abs 5.70      Lymphocytes, Absolute 1.82      Monocytes, Absolute 0.65      Eosinophils, Absolute 0.28      Basophils, Absolute 0.04      Immature Granulocytes, Absolute 0.04      nRBC, Absolute 0.00      Diff Type Auto     URINALYSIS, MICROSCOPIC - Abnormal    WBC, UA 5-10 (*)     RBC, UA 3-5 (*)     Bacteria, UA Moderate (*)     Squamous Epithelial Cells, UA Moderate (*)     Transitional Epithelial Cells, UA Few (*)     Mucus, UA Few (*)    CULTURE, URINE   CBC W/ AUTO DIFFERENTIAL    Narrative:     The following orders were created for panel order CBC auto differential.  Procedure                               Abnormality         Status                     ---------                               -----------         ------                     CBC with Differential[[id number]]       Abnormal            Final result                 Please view results for these tests on the individual orders.          Imaging Results              X-Ray Hand 3 view Right (Final result)  Result time 07/20/25 12:46:30      Final result by Jorje Carty MD (07/20/25 12:46:30)                   Impression:      1. No acute displaced fracture or dislocation of the hand noting dorsal edema.      Electronically signed by: Jorje Carty MD  Date:    07/20/2025  Time:    12:46               Narrative:    EXAMINATION:  XR HAND COMPLETE 3 VIEW RIGHT    CLINICAL HISTORY:  cat bite with infection;    TECHNIQUE:  PA, lateral, and oblique views of the right hand were performed.    COMPARISON:  None    FINDINGS:  Three views right hand.    No acute displaced fracture or dislocation of the hand.  No radiopaque foreign body.  There is edema about the dorsal aspect of the hand.                                    X-Rays:   Independently Interpreted Readings:   Other Readings:  Xray right hand shows no acute displaced fracture or dislocation of the hand noting dorsal edema    Medications   cefTRIAXone  injection 1 g (1 g Intravenous Given 7/20/25 1332)     Medical Decision Making  Presented with c/o concern for worsening of redness to right hand at site of cat bite. Was evaluated in the ED on 7/18 1 day following a cat bite. Received Rocephin 1 Gm IVP in ED. Was started on Augmentin. Pt reports compliance with med. Denies any drng from wound. Denies fever or chills or n/v.    Amount and/or Complexity of Data Reviewed  External Data Reviewed: labs and notes.     Details: From visit on 7/18 noted.  Labs: ordered. Decision-making details documented in ED Course.     Details: WBC 8530 with H&H 13.5 and 37.9, plt [id number], BUN 8, creatinine .75, Na 138, K+ 4.1, ALT 43, AST 45. UA shows small leukocytes and 5-10 WBC. Denies dysuria.  Radiology: ordered.     Details: Xray right hand shows no acute displaced fracture or dislocation of the hand noting dorsal edema    Risk  Prescription drug management.  Risk Details: Rocephin 1 Gm IVP given.     I treated pt in ED on 7/18. Improvement noted to wound. Decreased in edema noted by presence of skin wrinkling at site-was taunt 7/18, decrease in erythema- extends to lower aspect of forearm-had red streaks to AC area on 7/18, no open wound or drng. Pt is afebrile. HR 58. WBC improved from 78103 to 8530 today.    Discussed assessment and diagnostic findings with pt.  Instructed on home care, continued Augmentin med use, follow-up and return precautions. Discharged home with detailed written instructions provided.                 ED Course as of 07/20/25 1630   Sun Jul 20, 2025   1313 WBC: 8.53 [DS]   1313 Hemoglobin: 13.5 [DS]   1313 Hematocrit(!): 37.9 [DS]   1313 Platelet Count: 236 [DS]   1313 Sodium: 138 [DS]   1313 Potassium: 4.1 [DS]   1313 Glucose(!): 115 [DS]   1313 BUN(!): 8 [DS]   1313 Creatinine: 0.75 [DS]   1313 ALT: 43 [DS]   1313 AST: 45 [DS]   1313 WBC, UA(!): 5-10 [DS]   1313 Leukocyte Esterase, UA(!): Small [DS]      ED Course User Index  [DS] Dyan Chapin, NP                            Clinical Impression:   Final diagnoses:  [S61.451D, W55.01XD] Cat bite of right hand, subsequent encounter (Primary)  [L03.113] Cellulitis of right upper extremity - right hand        ED Disposition Condition    Discharge Stable          ED Prescriptions       Medication Sig Dispense Start Date End Date Auth. Provider    fluconazole (DIFLUCAN) 200 MG Tab Take 1 tablet (200 mg total) by mouth once daily. Take 1 tablet at onset of symptoms and if still taking antibiotic take 2nd dose once antibiotic is completed. for 2 doses 2 tablet 7/20/2025 7/22/2025 Dyan Chapin NP          Follow-up Information       Follow up With Specialties Details Why Contact Info    [facility] - Emergency Department Emergency Medicine  If symptoms worsen [address] [place] [id number] [phone]               [1]   Social History  Tobacco Use    Smoking status: Never    Smokeless tobacco: Never   Vaping Use    Vaping status: Never Used   Substance Use Topics    Alcohol use: Never    Drug use: Never        Dyan Chapin NP  07/20/25 9539

## 2025-07-23 LAB — UA COMPLETE W REFLEX CULTURE PNL UR: NO GROWTH

## 2025-08-19 ENCOUNTER — HOSPITAL ENCOUNTER (OUTPATIENT)
Dept: RADIOLOGY | Facility: HOSPITAL | Age: 60
Discharge: HOME | End: 2025-08-19
Attending: ORTHOPAEDIC SURGERY
Payer: COMMERCIAL

## 2025-08-19 ENCOUNTER — OFFICE VISIT (OUTPATIENT)
Dept: ORTHOPEDICS | Facility: CLINIC | Age: 60
End: 2025-08-19
Payer: COMMERCIAL

## 2025-08-19 VITALS
OXYGEN SATURATION: 97 % | WEIGHT: 199.94 LBS | HEART RATE: 62 BPM | DIASTOLIC BLOOD PRESSURE: 65 MMHG | HEIGHT: 64 IN | SYSTOLIC BLOOD PRESSURE: 92 MMHG | BODY MASS INDEX: 34.13 KG/M2

## 2025-08-19 DIAGNOSIS — S92.351A CLOSED DISPLACED FRACTURE OF FIFTH METATARSAL BONE OF RIGHT FOOT, INITIAL ENCOUNTER: ICD-10-CM

## 2025-08-19 DIAGNOSIS — M79.673 PAIN OF FOOT, UNSPECIFIED LATERALITY: ICD-10-CM

## 2025-08-19 DIAGNOSIS — M79.673 PAIN OF FOOT, UNSPECIFIED LATERALITY: Primary | ICD-10-CM

## 2025-08-19 DIAGNOSIS — S93.401A SPRAIN OF RIGHT ANKLE, UNSPECIFIED LIGAMENT, INITIAL ENCOUNTER: ICD-10-CM

## 2025-08-19 PROCEDURE — 99202 OFFICE O/P NEW SF 15 MIN: CPT | Mod: 57,S$GLB,, | Performed by: ORTHOPAEDIC SURGERY

## 2025-08-19 PROCEDURE — 99999 PR PBB SHADOW E&M-EST. PATIENT-LVL IV: CPT | Mod: PBBFAC,,, | Performed by: ORTHOPAEDIC SURGERY

## 2025-08-19 PROCEDURE — 3008F BODY MASS INDEX DOCD: CPT | Mod: S$GLB,,, | Performed by: ORTHOPAEDIC SURGERY

## 2025-08-19 PROCEDURE — 3074F SYST BP LT 130 MM HG: CPT | Mod: S$GLB,,, | Performed by: ORTHOPAEDIC SURGERY

## 2025-08-19 PROCEDURE — 28470 CLTX METATARSAL FX WO MNP EA: CPT | Mod: S$GLB,,, | Performed by: ORTHOPAEDIC SURGERY

## 2025-08-19 PROCEDURE — 3078F DIAST BP <80 MM HG: CPT | Mod: S$GLB,,, | Performed by: ORTHOPAEDIC SURGERY

## 2025-08-19 PROCEDURE — 1159F MED LIST DOCD IN RCRD: CPT | Mod: S$GLB,,, | Performed by: ORTHOPAEDIC SURGERY

## 2025-08-19 PROCEDURE — 73630 X-RAY EXAM OF FOOT: CPT | Mod: TC,RT

## 2025-08-19 PROCEDURE — 73630 X-RAY EXAM OF FOOT: CPT | Mod: 26,RT,, | Performed by: ORTHOPAEDIC SURGERY

## 2025-08-19 RX ORDER — TRAMADOL HYDROCHLORIDE 50 MG/1
50 TABLET, FILM COATED ORAL EVERY 8 HOURS PRN
Qty: 20 TABLET | Refills: 0 | Status: SHIPPED | OUTPATIENT
Start: 2025-08-19

## 2025-08-20 ENCOUNTER — TELEPHONE (OUTPATIENT)
Dept: ORTHOPEDICS | Facility: CLINIC | Age: 60
End: 2025-08-20
Payer: COMMERCIAL